# Patient Record
Sex: MALE | Race: WHITE | NOT HISPANIC OR LATINO | Employment: FULL TIME | URBAN - METROPOLITAN AREA
[De-identification: names, ages, dates, MRNs, and addresses within clinical notes are randomized per-mention and may not be internally consistent; named-entity substitution may affect disease eponyms.]

---

## 2023-09-07 PROBLEM — R53.83 FATIGUE: Status: ACTIVE | Noted: 2023-09-07

## 2023-09-07 PROBLEM — E78.00 PURE HYPERCHOLESTEROLEMIA: Status: ACTIVE | Noted: 2023-09-07

## 2023-09-07 PROBLEM — B02.9 SHINGLES: Status: ACTIVE | Noted: 2023-09-07

## 2023-09-07 PROBLEM — R94.5 ABNORMAL RESULTS OF LIVER FUNCTION STUDIES: Status: ACTIVE | Noted: 2023-09-07

## 2023-09-07 PROBLEM — N20.0 KIDNEY STONE: Status: ACTIVE | Noted: 2023-09-07

## 2023-09-07 PROBLEM — G44.209 TENSION HEADACHE: Status: ACTIVE | Noted: 2023-09-07

## 2023-09-07 PROBLEM — E55.9 VITAMIN D DEFICIENCY: Status: ACTIVE | Noted: 2023-09-07

## 2023-09-07 RX ORDER — CHOLECALCIFEROL (VITAMIN D3) 50 MCG
1 TABLET ORAL
COMMUNITY
Start: 2011-04-20

## 2023-09-07 RX ORDER — ACETAMINOPHEN 500 MG
1 TABLET ORAL 2 TIMES DAILY
COMMUNITY
End: 2024-01-29 | Stop reason: ALTCHOICE

## 2024-01-29 ENCOUNTER — OFFICE VISIT (OUTPATIENT)
Dept: PRIMARY CARE | Facility: CLINIC | Age: 44
End: 2024-01-29
Payer: COMMERCIAL

## 2024-01-29 VITALS
SYSTOLIC BLOOD PRESSURE: 98 MMHG | WEIGHT: 153 LBS | BODY MASS INDEX: 21.9 KG/M2 | TEMPERATURE: 97.6 F | DIASTOLIC BLOOD PRESSURE: 59 MMHG | HEART RATE: 61 BPM | HEIGHT: 70 IN | OXYGEN SATURATION: 98 %

## 2024-01-29 DIAGNOSIS — E78.2 MIXED HYPERLIPIDEMIA: ICD-10-CM

## 2024-01-29 DIAGNOSIS — Z12.11 COLON CANCER SCREENING: ICD-10-CM

## 2024-01-29 DIAGNOSIS — G89.29 CHRONIC RIGHT SHOULDER PAIN: ICD-10-CM

## 2024-01-29 DIAGNOSIS — Z13.6 SCREENING, HEART DISEASE, ISCHEMIC: ICD-10-CM

## 2024-01-29 DIAGNOSIS — M25.511 CHRONIC RIGHT SHOULDER PAIN: ICD-10-CM

## 2024-01-29 DIAGNOSIS — F41.9 ANXIETY: Primary | ICD-10-CM

## 2024-01-29 PROCEDURE — 1036F TOBACCO NON-USER: CPT | Performed by: INTERNAL MEDICINE

## 2024-01-29 PROCEDURE — 99213 OFFICE O/P EST LOW 20 MIN: CPT | Performed by: INTERNAL MEDICINE

## 2024-01-29 RX ORDER — OMEGA-3 FATTY ACIDS/FISH OIL 300-500 MG
1 CAPSULE ORAL EVERY 24 HOURS
COMMUNITY

## 2024-01-29 RX ORDER — ESCITALOPRAM OXALATE 5 MG/1
5 TABLET ORAL NIGHTLY
COMMUNITY
End: 2024-01-29 | Stop reason: SDUPTHER

## 2024-01-29 RX ORDER — ESCITALOPRAM OXALATE 5 MG/1
5 TABLET ORAL NIGHTLY
Qty: 90 TABLET | Refills: 3 | Status: SHIPPED | OUTPATIENT
Start: 2024-01-29 | End: 2025-01-28

## 2024-01-29 ASSESSMENT — ENCOUNTER SYMPTOMS
LOSS OF SENSATION IN FEET: 0
OCCASIONAL FEELINGS OF UNSTEADINESS: 0
PALPITATIONS: 0
DEPRESSION: 0
SHORTNESS OF BREATH: 0

## 2024-01-29 ASSESSMENT — PAIN SCALES - GENERAL: PAINLEVEL: 2

## 2024-01-29 ASSESSMENT — PATIENT HEALTH QUESTIONNAIRE - PHQ9
1. LITTLE INTEREST OR PLEASURE IN DOING THINGS: NOT AT ALL
SUM OF ALL RESPONSES TO PHQ9 QUESTIONS 1 AND 2: 0
2. FEELING DOWN, DEPRESSED OR HOPELESS: NOT AT ALL

## 2024-01-29 NOTE — PATIENT INSTRUCTIONS
Diagnoses and all orders for this visit:  Anxiety  Comments:  Doing well with Lexapro, life long treatment.  Orders:  -     escitalopram (Lexapro) 5 mg tablet; Take 1 tablet (5 mg) by mouth once daily at bedtime.  -     CBC; Future  Screening, heart disease, ischemic  Comments:  Scheduled in May for June CT heart calcium score [phone]  Orders:  -     CT cardiac scoring wo IV contrast; Future  Colon cancer screening  Comments:  Recommend colonscopy at 44 's group [phone]  Mixed hyperlipidemia  -     Lipid Panel; Future  -     Comprehensive Metabolic Panel; Future  Chronic right shoulder pain  Comments:  Exercises reviewed for right shoulder pain. If continues then ortho. lidya Ivory [phone] is very good.  Other orders  -     Follow Up In Primary Care - Health Maintenance; Future

## 2024-01-29 NOTE — PROGRESS NOTES
[facility]: [facility]  PROGRESS NOTE      Burt Wetzel is a 43 y.o. male that is presenting today for Anxiety (6 mo fu).    Assessment/Plan   Diagnoses and all orders for this visit:  Anxiety  Comments:  Doing well with Lexapro, life long treatment.  Orders:  -     escitalopram (Lexapro) 5 mg tablet; Take 1 tablet (5 mg) by mouth once daily at bedtime.  -     CBC; Future  Screening, heart disease, ischemic  Comments:  Scheduled in May for June CT heart calcium score [phone]  Orders:  -     CT cardiac scoring wo IV contrast; Future  Colon cancer screening  Comments:  Recommend colonscopy at 45 's group [phone]  Mixed hyperlipidemia  -     Lipid Panel; Future  -     Comprehensive Metabolic Panel; Future  Chronic right shoulder pain  Comments:  Exercises reviewed for right shoulder pain. If continues then ortho. lidya Ivory [phone] is very good.  Other orders  -     Follow Up In Primary Care - Health Maintenance; Future    Subjective   Anxiety doing well. Fam Hx of CAD CT CAC score scheduled 6/24, colon cancer screening at 43 y/o.  Right shoulder pain off/on.    Anxiety  Patient reports no chest pain, palpitations or shortness of breath.       Review of Systems   Respiratory:  Negative for shortness of breath.    Cardiovascular:  Negative for chest pain and palpitations.   All other systems reviewed and are negative.     Objective   Vitals:    01/29/24 0839   BP: 98/59   Pulse: 61   Temp: 36.4 °C (97.6 °F)   SpO2: 98%      Body mass index is 22.27 kg/m².  Physical Exam  Constitutional:       General: He is not in acute distress.  HENT:      Head: Normocephalic and atraumatic.      Right Ear: Tympanic membrane normal.      Left Ear: Tympanic membrane normal.      Mouth/Throat:      Mouth: Mucous membranes are moist.      Pharynx: Oropharynx is clear.   Eyes:      Extraocular Movements: Extraocular movements intact.      Conjunctiva/sclera: Conjunctivae normal.       "Pupils: Pupils are equal, round, and reactive to light.   Cardiovascular:      Rate and Rhythm: Normal rate and regular rhythm.   Pulmonary:      Breath sounds: Normal breath sounds.   Abdominal:      General: Bowel sounds are normal.      Palpations: Abdomen is soft. There is no mass.   Musculoskeletal:         General: Normal range of motion.      Cervical back: Neck supple. No tenderness.   Skin:     General: Skin is warm and dry.   Neurological:      General: No focal deficit present.      Mental Status: He is oriented to person, place, and time.     Diagnostic Results   Lab Results   Component Value Date    GLUCOSE 87 07/17/2023    CALCIUM 9.4 07/17/2023     07/17/2023    K 4.2 07/17/2023    CO2 25 07/17/2023     07/17/2023    BUN 12 07/17/2023    CREATININE 0.7 07/17/2023     Lab Results   Component Value Date    ALT 18 07/17/2023    AST 18 07/17/2023    ALKPHOS 59 07/17/2023    BILITOT 0.3 07/17/2023     Lab Results   Component Value Date    WBC 9.2 07/17/2023    HGB 14.0 07/17/2023    HCT 41.7 07/17/2023    MCV 93.7 07/17/2023     07/17/2023     Lab Results   Component Value Date    CHOL 182 01/26/2023     Lab Results   Component Value Date    HDL 59 01/26/2023     Lab Results   Component Value Date    LDLCALC 106 01/26/2023     Lab Results   Component Value Date    TRIG 87 01/26/2023     No components found for: \"CHOLHDL\"  No results found for: \"HGBA1C\"  Other labs not included in the list above were reviewed either before or during this encounter.    History    Past Medical History:   Diagnosis Date   • Abnormal results of liver function studies 09/07/2023    Recheck nl, Hep C/B, ROBERTO, iron nl.1/23   • Anxiety 01/29/2024    life long SSRI tx doing well.   • Kidney stone 09/07/2023    Uric acid, right epidudymal cyst, hematospermia .   • Pure hypercholesterolemia 09/07/2023 7/23 TSH nl CT CAC 6/24 ordred father 52.   • Screening, heart disease, ischemic 01/29/2024    Recommend " colonscopy at 45 's group [phone]   • Shingles 09/07/2023    Left groin, thigh remote     History reviewed. No pertinent surgical history.  Family History   Problem Relation Name Age of Onset   • Lung disease Mother     • Heart attack Father  52        heavy smoker   • Hypertension Other grandmother    • Other (s/p CABG) Other grandmother      Social History     Socioeconomic History   • Marital status:      Spouse name: Not on file   • Number of children: Not on file   • Years of education: Not on file   • Highest education level: Not on file   Occupational History   • Not on file   Tobacco Use   • Smoking status: Former     Types: Cigarettes     Passive exposure: Past   • Smokeless tobacco: Never   Vaping Use   • Vaping Use: Never used   Substance and Sexual Activity   • Alcohol use: Yes   • Drug use: Never   • Sexual activity: Not on file   Other Topics Concern   • Not on file   Social History Narrative   • Not on file     Social Determinants of Health     Financial Resource Strain: Not on file   Food Insecurity: Not on file   Transportation Needs: Not on file   Physical Activity: Not on file   Stress: Not on file   Social Connections: Not on file   Intimate Partner Violence: Not on file   Housing Stability: Not on file     No Known Allergies  Current Outpatient Medications on File Prior to Visit   Medication Sig Dispense Refill   • cholecalciferol (Vitamin D-3) 50 MCG (2000 UT) tablet Take 1 tablet (2,000 Units) by mouth once daily with breakfast.     • NON FORMULARY Athletic greens     • omega-3 fatty acids-fish oil (Fish OiL) 300-500 mg capsule 1 capsule (1,000 mg) once every 24 hours.     • [DISCONTINUED] escitalopram (Lexapro) 5 mg tablet Take 1 tablet (5 mg) by mouth once daily at bedtime.     • [DISCONTINUED] omega-3 (Fish OiL) 300-1,000 mg capsule Take 1 capsule (1,000 mg) by mouth 2 times a day.       No current facility-administered medications on file prior to visit.      Immunization History   Administered Date(s) Administered   • Moderna SARS-CoV-2 Vaccination 04/07/2021, 05/07/2021   • Tdap vaccine, age 7 year and older (BOOSTRIX, ADACEL) 07/24/2023     Patient's medical history was reviewed and updated either before or during this encounter.       Alexis Crouch MD

## 2024-08-02 ENCOUNTER — APPOINTMENT (OUTPATIENT)
Dept: PRIMARY CARE | Facility: CLINIC | Age: 44
End: 2024-08-02
Payer: COMMERCIAL

## 2025-01-16 ENCOUNTER — APPOINTMENT (OUTPATIENT)
Dept: UROLOGY | Facility: CLINIC | Age: 45
End: 2025-01-16
Payer: COMMERCIAL

## 2025-03-06 ENCOUNTER — HOSPITAL ENCOUNTER (OUTPATIENT)
Dept: RADIOLOGY | Facility: CLINIC | Age: 45
Discharge: HOME | End: 2025-03-06
Payer: COMMERCIAL

## 2025-03-06 DIAGNOSIS — Z87.442 PERSONAL HISTORY OF URINARY CALCULI: ICD-10-CM

## 2025-03-06 PROCEDURE — 74176 CT ABD & PELVIS W/O CONTRAST: CPT
